# Patient Record
Sex: MALE | Race: WHITE | Employment: FULL TIME | URBAN - METROPOLITAN AREA
[De-identification: names, ages, dates, MRNs, and addresses within clinical notes are randomized per-mention and may not be internally consistent; named-entity substitution may affect disease eponyms.]

---

## 2019-06-10 ENCOUNTER — NURSE ONLY (OUTPATIENT)
Dept: CARDIOLOGY CLINIC | Age: 59
End: 2019-06-10

## 2019-06-10 ENCOUNTER — HOSPITAL ENCOUNTER (OUTPATIENT)
Dept: VASCULAR LAB | Age: 59
Discharge: HOME | End: 2019-06-10

## 2019-06-10 DIAGNOSIS — Z00.00 ANNUAL PHYSICAL EXAM: Primary | ICD-10-CM

## 2019-06-10 PROCEDURE — 76706 US ABDL AORTA SCREEN AAA: CPT | Performed by: INTERNAL MEDICINE

## 2019-06-10 PROCEDURE — 9900000021 HC VASC SCREENING EXAM - SELF PAY

## 2019-06-12 ENCOUNTER — OFFICE VISIT (OUTPATIENT)
Dept: CARDIOLOGY CLINIC | Age: 59
End: 2019-06-12

## 2019-06-12 ENCOUNTER — HOSPITAL ENCOUNTER (OUTPATIENT)
Dept: NON INVASIVE DIAGNOSTICS | Age: 59
Discharge: HOME | End: 2019-06-12
Payer: COMMERCIAL

## 2019-06-12 VITALS
OXYGEN SATURATION: 98 % | DIASTOLIC BLOOD PRESSURE: 82 MMHG | HEIGHT: 74 IN | BODY MASS INDEX: 28.11 KG/M2 | HEART RATE: 83 BPM | WEIGHT: 219 LBS | SYSTOLIC BLOOD PRESSURE: 122 MMHG

## 2019-06-12 DIAGNOSIS — Z00.00 ANNUAL PHYSICAL EXAM: Primary | ICD-10-CM

## 2019-06-12 PROCEDURE — 93017 CV STRESS TEST TRACING ONLY: CPT | Performed by: INTERNAL MEDICINE

## 2019-06-12 PROCEDURE — 99214 OFFICE O/P EST MOD 30 MIN: CPT | Performed by: NURSE PRACTITIONER

## 2019-06-12 PROCEDURE — MISCLABM CINFIN LAB WORK PLUS PSA: Performed by: NURSE PRACTITIONER

## 2019-06-12 PROCEDURE — 93015 CV STRESS TEST SUPVJ I&R: CPT | Performed by: INTERNAL MEDICINE

## 2019-06-12 NOTE — PROGRESS NOTES
Patient instructed on Ildefonso Protocol Stress Test Procedure including possible side effects and adverse reactions. Verbalizes knowledge and understanding and denies having any questions.

## 2019-06-12 NOTE — LETTER
[facility]  [address] [address] [address] [address]  Phone: [phone]  Fax: [phone]    June 16, 2019     Toy Samuel, [address]  [address] [address]    Patient: Dex Mullins  MR Number: [id number]  YOB: 1960  Date of Visit: 6/12/2019    Dear Dr. Toy Samuel:        Giselle Albarran Annual Physical Exam        6/12/19    Dex Mullins 1960 64 y.o. Diagnosis Orders   1. Annual physical exam         Primary Prevention:   Wears seat belt: Yes   Screening colonoscopy: polypectomy '16   Annual opthalmologic exam: Yes   Smoking: No   Diet: loves carbs / pizza    Exercise: walks 1 mile / daily plus walks dog; Bike 45 min, lift on weekends, cut grass weekly   Dermatology exam: recently removed left forehead ; routinely visits d/t sun exposure and s/p facial peals    Flu-vaccination: none in '18   Shingles vaccine: '15   Sunscreen: Yes    Secondary Prevention:    No current outpatient medications on file. No current facility-administered medications for this visit. Review of Systems   Constitutional: Negative for appetite change, + fatigue lessened taking supplements yet interrupted sleep pattern with nocturia. no unexpected weight change. HENT: Negative. Eyes: Negative. Respiratory: Negative. Negative for chest tightness and shortness of breath. Cardiovascular: Negative. Negative for chest pain, palpitations and leg swelling. Gastrointestinal: Negative. Negative for diarrhea and constipation. Genitourinary: Negative for urgency; + frequency d/t enlarged prostate; - hematuria and difficulty urinating. Musculoskeletal: Negative for myalgias, + back pain > phys therapy and core exercises, joint swelling and arthralgias. Skin: Negative. Neurological: + dizziness after bending over doing yard work; - syncope, weakness, light-headedness, numbness and headaches. Hematological: Negative. Component Value Date    LABVLDL 27      No results found for: CHOLHDLRATIO  Lab Results   Component Value Date         K 4.3          CO2 25     BUN 23     CREATININE 0.9     GLUCOSE 108     CALCIUM 9.2       Lab Results   Component Value Date    WBC 7.1     HGB 16.5     HCT 49.0     MCV 87.8           Lab Results   Component Value Date    I7GCIUO 1.01     Y0ANYBR 6.5      Lab Results   Component Value Date    PSA 0.28      Lab Results   Component Value Date    ALT 24     AST 26     ALKPHOS 87     BILITOT 0.5        Vascular screening : 6/10/19   Negative    6/10/19: Urinalysis: trace protein   Stool guaiac: negative    6/12/19: urinalysis : negative  6/12/19: GXT:   No EKG evidence for ischemia with exercise       Rest      ECG   NSR      Stress      Stress Type: Exercise      Stress Protocol: Ildefonso      Rest HR: 86 bpm                             HR BP Product: 33388   Rest BP: 134/92 mmHg                        Max Exercise: 13 METS   Stress Peak HR: 148 bpm   Stress Peak BP: 166/85 mmHg   Predicted HR: 163 bpm   % of predicted HR: 91   Test Duration: 10 min and 31 sec   Reason for Termination: Physiologic Maximum      Results      ECG   Sinus tachycardia, <1mm ST depression      Symptoms   No symptoms with exercise.   Denies chest pain or discomfort.                      1. Annual physical exam   ~unremarkable        Assessment    Elevated blood glucose    GXT: negative stress EKG for ischemia    Plan     Follow up in one year    If you have questions, please do not hesitate to call me. I look forward to following Sean Gens along with you.     Sincerely,        REJI Del Toro - CNP

## 2019-06-12 NOTE — PATIENT INSTRUCTIONS
Consider Crestor as primary prevention since your dad was < 48 yrs old when diagnosed with heart disease

## 2019-06-12 NOTE — PROGRESS NOTES
N7WILNW 1.01     G7LCCRN 6.5      Lab Results   Component Value Date    PSA 0.28      Lab Results   Component Value Date    ALT 24     AST 26     ALKPHOS 87     BILITOT 0.5        Vascular screening : 6/10/19   Negative    6/10/19: Urinalysis: trace protein   Stool guaiac: negative    6/12/19: urinalysis : negative    6/12/19: GXT:   No EKG evidence for ischemia with exercise       Rest      ECG   NSR      Stress      Stress Type: Exercise      Stress Protocol: Ildefonso      Rest HR: 86 bpm                             HR BP Product: 56098   Rest BP: 134/92 mmHg                        Max Exercise: 13 METS   Stress Peak HR: 148 bpm   Stress Peak BP: 166/85 mmHg   Predicted HR: 163 bpm   % of predicted HR: 91   Test Duration: 10 min and 31 sec   Reason for Termination: Physiologic Maximum      Results      ECG   Sinus tachycardia, <1mm ST depression      Symptoms   No symptoms with exercise.   Denies chest pain or discomfort.                      1. Annual physical exam   ~unremarkable        Assessment    Elevated blood glucose    GXT: negative stress EKG for ischemia    Plan     Follow up in one year          Yuliana Cramer CNP

## 2019-06-16 NOTE — COMMUNICATION BODY
Sitting, Cuff Size: Medium Adult)   Pulse 83   Ht 6' 2\" (1.88 m)   Wt 219 lb (99.3 kg)   SpO2 98%   BMI 28.12 kg/m²      Physical Exam   Constitutional: He is oriented to person, place, and time. He appears well-developed and well-nourished. HENT: external ear canals patent bilaterally; TM without injection, normal cone of light; oral mucosa moist/pink, uvula midline  Head: Normocephalic. Eyes: Pupils are equal, round, and reactive to light; gross fundoscopy exam normal   Neck: Neck supple. No JVD present. No tracheal deviation present. No thyromegaly present. Cardiovascular: Normal rate, regular rhythm and intact distal pulses. PMI is not displaced. No murmur heard. Pulmonary/Chest: Effort normal and breath sounds normal. He has no wheezes. He has no rales. He exhibits no tenderness. Abdominal: Soft. Bowel sounds are normal. He exhibits no distension and no mass. No tenderness. Musculoskeletal: Normal range of motion. He exhibits no edema and no tenderness. Lymphadenopathy: He has no cervical/axilla adenopathy. Neurological: He is alert and oriented to person, place, and time. No cranial nerve deficit. Coordination normal.   Skin: Skin is warm and dry. No rash noted. No erythema; scar left forehead . Psychiatric: He has a normal mood and affect.  His behavior is normal. Judgment and thought content normal.       Lab Results   Component Value Date    CHOL 214 06/10/2019     Lab Results   Component Value Date    TRIG 137      Lab Results   Component Value Date    HDL 53      Lab Results   Component Value Date    LDLCALC 134      Lab Results   Component Value Date    LABVLDL 27      No results found for: CHOLHDLRATIO  Lab Results   Component Value Date         K 4.3          CO2 25     BUN 23     CREATININE 0.9     GLUCOSE 108     CALCIUM 9.2       Lab Results   Component Value Date    WBC 7.1     HGB 16.5     HCT 49.0     MCV 87.8           Lab Results   Component Value Date    W1TXVYT 1.01     G5PNSCM 6.5      Lab Results   Component Value Date    PSA 0.28      Lab Results   Component Value Date    ALT 24     AST 26     ALKPHOS 87     BILITOT 0.5        Vascular screening : 6/10/19   Negative    6/10/19: Urinalysis: trace protein   Stool guaiac: negative    6/12/19: urinalysis : negative    6/12/19: GXT:   No EKG evidence for ischemia with exercise       Rest      ECG   NSR      Stress      Stress Type: Exercise      Stress Protocol: Ildefonso      Rest HR: 86 bpm                             HR BP Product: 77060   Rest BP: 134/92 mmHg                        Max Exercise: 13 METS   Stress Peak HR: 148 bpm   Stress Peak BP: 166/85 mmHg   Predicted HR: 163 bpm   % of predicted HR: 91   Test Duration: 10 min and 31 sec   Reason for Termination: Physiologic Maximum      Results      ECG   Sinus tachycardia, <1mm ST depression      Symptoms   No symptoms with exercise.   Denies chest pain or discomfort.                      1. Annual physical exam   ~unremarkable        Assessment    Elevated blood glucose    GXT: negative stress EKG for ischemia    Plan     Follow up in one year          Neeraj Teague CNP

## 2021-03-29 ENCOUNTER — HOSPITAL ENCOUNTER (OUTPATIENT)
Dept: VASCULAR LAB | Age: 61
Discharge: HOME | End: 2021-03-29
Payer: COMMERCIAL

## 2021-03-29 ENCOUNTER — NURSE ONLY (OUTPATIENT)
Dept: CARDIOLOGY CLINIC | Age: 61
End: 2021-03-29

## 2021-03-29 DIAGNOSIS — Z00.00 ANNUAL PHYSICAL EXAM: Primary | ICD-10-CM

## 2021-03-29 LAB
A/G RATIO: 2.5 (ref 1.1–2.2)
ALBUMIN SERPL-MCNC: 4.8 G/DL (ref 3.4–5)
ALP BLD-CCNC: 82 U/L (ref 40–129)
ALT SERPL-CCNC: 25 U/L (ref 10–40)
ANION GAP SERPL CALCULATED.3IONS-SCNC: 10 MMOL/L (ref 3–16)
AST SERPL-CCNC: 28 U/L (ref 15–37)
BASOPHILS ABSOLUTE: 0.1 K/UL (ref 0–0.2)
BASOPHILS RELATIVE PERCENT: 1.5 %
BILIRUB SERPL-MCNC: 0.4 MG/DL (ref 0–1)
BILIRUBIN DIRECT: <0.2 MG/DL (ref 0–0.3)
BILIRUBIN, INDIRECT: NORMAL MG/DL (ref 0–1)
BUN BLDV-MCNC: 15 MG/DL (ref 7–20)
CALCIUM SERPL-MCNC: 9.4 MG/DL (ref 8.3–10.6)
CHLORIDE BLD-SCNC: 103 MMOL/L (ref 99–110)
CHOLESTEROL, TOTAL: 211 MG/DL (ref 0–199)
CO2: 29 MMOL/L (ref 21–32)
CREAT SERPL-MCNC: 0.8 MG/DL (ref 0.9–1.3)
EOSINOPHILS ABSOLUTE: 0.1 K/UL (ref 0–0.6)
EOSINOPHILS RELATIVE PERCENT: 1.6 %
GFR AFRICAN AMERICAN: >60
GFR NON-AFRICAN AMERICAN: >60
GLOBULIN: 1.9 G/DL
GLUCOSE BLD-MCNC: 99 MG/DL (ref 70–99)
HCT VFR BLD CALC: 48.8 % (ref 40.5–52.5)
HDLC SERPL-MCNC: 42 MG/DL (ref 40–60)
HEMOGLOBIN: 16.5 G/DL (ref 13.5–17.5)
HEPATITIS C ANTIBODY INTERPRETATION: NORMAL
LDL CHOLESTEROL CALCULATED: 139 MG/DL
LYMPHOCYTES ABSOLUTE: 2.1 K/UL (ref 1–5.1)
LYMPHOCYTES RELATIVE PERCENT: 36.6 %
MCH RBC QN AUTO: 29.8 PG (ref 26–34)
MCHC RBC AUTO-ENTMCNC: 33.9 G/DL (ref 31–36)
MCV RBC AUTO: 88.1 FL (ref 80–100)
MONOCYTES ABSOLUTE: 0.5 K/UL (ref 0–1.3)
MONOCYTES RELATIVE PERCENT: 7.9 %
NEUTROPHILS ABSOLUTE: 3.1 K/UL (ref 1.7–7.7)
NEUTROPHILS RELATIVE PERCENT: 52.4 %
PDW BLD-RTO: 13.3 % (ref 12.4–15.4)
PLATELET # BLD: 201 K/UL (ref 135–450)
PMV BLD AUTO: 8.6 FL (ref 5–10.5)
POTASSIUM SERPL-SCNC: 4.7 MMOL/L (ref 3.5–5.1)
PROSTATE SPECIFIC ANTIGEN: 0.3 NG/ML (ref 0–4)
RBC # BLD: 5.53 M/UL (ref 4.2–5.9)
SODIUM BLD-SCNC: 142 MMOL/L (ref 136–145)
T3 TOTAL: 0.97 NG/ML (ref 0.8–2)
T4 TOTAL: 6.5 UG/DL (ref 4.5–10.9)
TOTAL PROTEIN: 6.7 G/DL (ref 6.4–8.2)
TRIGL SERPL-MCNC: 151 MG/DL (ref 0–150)
VLDLC SERPL CALC-MCNC: 30 MG/DL
WBC # BLD: 5.8 K/UL (ref 4–11)

## 2021-03-29 PROCEDURE — 9900000021 HC VASC SCREENING EXAM - SELF PAY

## 2021-03-29 PROCEDURE — 36415 COLL VENOUS BLD VENIPUNCTURE: CPT | Performed by: NURSE PRACTITIONER

## 2021-03-29 NOTE — PROGRESS NOTES
Patient came in today for labs for Ivis Fin. Patient tolerated well. Urine dip performed along with stool card.

## 2021-03-30 ENCOUNTER — TELEPHONE (OUTPATIENT)
Dept: CARDIOLOGY CLINIC | Age: 61
End: 2021-03-30

## 2021-04-01 ENCOUNTER — OFFICE VISIT (OUTPATIENT)
Dept: CARDIOLOGY CLINIC | Age: 61
End: 2021-04-01

## 2021-04-01 ENCOUNTER — HOSPITAL ENCOUNTER (OUTPATIENT)
Dept: NON INVASIVE DIAGNOSTICS | Age: 61
Discharge: HOME | End: 2021-04-01
Payer: COMMERCIAL

## 2021-04-01 VITALS
HEIGHT: 74 IN | SYSTOLIC BLOOD PRESSURE: 114 MMHG | BODY MASS INDEX: 27.64 KG/M2 | WEIGHT: 215.4 LBS | HEART RATE: 85 BPM | OXYGEN SATURATION: 98 % | DIASTOLIC BLOOD PRESSURE: 82 MMHG

## 2021-04-01 DIAGNOSIS — R94.39 ABNORMAL STRESS ECG: ICD-10-CM

## 2021-04-01 DIAGNOSIS — Z00.00 ANNUAL PHYSICAL EXAM: Primary | ICD-10-CM

## 2021-04-01 PROCEDURE — 76706 US ABDL AORTA SCREEN AAA: CPT | Performed by: INTERNAL MEDICINE

## 2021-04-01 PROCEDURE — 99214 OFFICE O/P EST MOD 30 MIN: CPT | Performed by: NURSE PRACTITIONER

## 2021-04-01 PROCEDURE — 93017 CV STRESS TEST TRACING ONLY: CPT | Performed by: INTERNAL MEDICINE

## 2021-04-01 PROCEDURE — 93015 CV STRESS TEST SUPVJ I&R: CPT | Performed by: INTERNAL MEDICINE

## 2021-04-01 PROCEDURE — MISCLABM CINFIN LAB WORK PLUS PSA: Performed by: NURSE PRACTITIONER

## 2021-04-01 RX ORDER — ACETAMINOPHEN,DIPHENHYDRAMINE HCL 500; 25 MG/1; MG/1
1 TABLET, FILM COATED ORAL NIGHTLY PRN
COMMUNITY

## 2021-04-01 RX ORDER — LANOLIN ALCOHOL/MO/W.PET/CERES
3 CREAM (GRAM) TOPICAL DAILY
COMMUNITY

## 2021-04-01 NOTE — PROGRESS NOTES
[facility] 5 Annual Physical Exam        4/1/21    Jenna Pritchett 1960 64 y.o. Diagnosis Orders   1. Annual physical exam         Primary Prevention:   Wears seat belt: Yes   Screening colonoscopy: polypectomy '16   Annual opthalmologic exam: Yes   Smoking: No   Diet:  cutting back on carbs (healthy life style modification mtg at work x4 weeks)   Exercise: walks 1 mile / daily plus walks dog; lifts wts 1-2 x / week ; summertime rides bike   Dermatology exam:  routinely visits d/t sun exposure and s/p facial peals    Flu-vaccination: none '20   Shingles vaccine: '15   COVID-19 vaccination : Pfizer    Sunscreen: Yes    Current Outpatient Medications   Medication Sig Dispense Refill    NONFORMULARY Zinc daily      UNABLE TO FIND Iodomere daily      melatonin 3 MG TABS tablet Take 3 mg by mouth daily      UNABLE TO FIND AF Betafood daily      NONFORMULARY Drenamin qd otc      diphenhydrAMINE-APAP, sleep, (TYLENOL PM EXTRA STRENGTH)  MG tablet Take 1 tablet by mouth nightly as needed for Sleep       No current facility-administered medications for this visit. Review of Systems   Constitutional: Negative for appetite change, + fatigue lessened taking supplements yet interrupted sleep pattern with nocturia. no unexpected weight change. HENT: Negative. Eyes: Negative. Respiratory: Negative. Negative for chest tightness and shortness of breath. Cardiovascular: Negative. Negative for chest pain, palpitations and leg swelling. Gastrointestinal: Negative. Negative for diarrhea and constipation. Genitourinary: Negative for urgency; + frequency d/t enlarged prostate > taking supplements which has helped; - hematuria and difficulty urinating. Musculoskeletal: Negative for myalgias, + back pain > core exercises, joint swelling and arthralgias. Skin: Negative.     Neurological: + faint feeling after bending over doing yard work; - syncope, weakness, light-headedness, numbness and headaches. Hematological: Negative. Psychiatric/Behavioral: Negative. The patient is not nervous/anxious. Vitals:    04/01/21 1144 04/01/21 1210   BP: 120/78 114/82   Site: Right Upper Arm    Position: Sitting    Cuff Size: Large Adult    Pulse: 85    SpO2: 98%    Weight: 215 lb 6.4 oz (97.7 kg)    Height: 6' 2\" (1.88 m)      Physical Exam   Constitutional: He is oriented to person, place, and time. He appears well-developed and well-nourished. HENT: external ear canals patent bilaterally with moderately large amt cerumen; TM without injection, normal cone of light; oral exam deferred d/t pandemic safety guidelines in place  Head: Normocephalic. Eyes: Pupils are equal, round, and reactive to light; gross fundoscopy exam normal   Neck: Neck supple. No JVD present. No tracheal deviation present. No thyromegaly present. Cardiovascular: Normal rate, regular rhythm and intact distal pulses. PMI is not displaced. No murmur heard. Pulmonary/Chest: Effort normal and breath sounds normal. He has no wheezes. He has no rales. He exhibits no tenderness. Abdominal: Soft. Bowel sounds are normal. He exhibits no distension and no mass. No tenderness. Musculoskeletal: Normal range of motion. He exhibits no edema and no tenderness. Lymphadenopathy: He has no cervical/axilla adenopathy. Neurological: He is alert and oriented to person, place, and time. No cranial nerve deficit. Coordination normal.   Skin: Skin is warm and dry. No rash noted. No erythema; scar left forehead . Psychiatric: He has a normal mood and affect.  His behavior is normal. Judgment and thought content normal.     Lab Results   Component Value Date    CHOL 211 (H) 03/29/2021    CHOL 216 (H) 04/06/2016    CHOL 200 (H) 05/08/2014     Lab Results   Component Value Date    TRIG 151 (H) 03/29/2021    TRIG 156 (H) 04/06/2016    TRIG 221 (H) 05/08/2014     Lab Results   Component Value Date    HDL 42 03/29/2021    HDL 48 04/06/2016    HDL 52 05/08/2014     Lab Results   Component Value Date    LDLCALC 139 (H) 03/29/2021    LDLCALC 137 (H) 04/06/2016    LDLCALC 104 (H) 05/08/2014     Lab Results   Component Value Date    LABVLDL 30 03/29/2021    LABVLDL 31 04/06/2016    LABVLDL 44 05/08/2014     No results found for: CHOLHDLRATIO   Lab Results   Component Value Date     03/29/2021    K 4.7 03/29/2021     03/29/2021    CO2 29 03/29/2021    BUN 15 03/29/2021    CREATININE 0.8 (L) 03/29/2021    GLUCOSE 99 03/29/2021    CALCIUM 9.4 03/29/2021    PROT 6.7 03/29/2021    LABALBU 4.8 03/29/2021    BILITOT 0.4 03/29/2021    ALKPHOS 82 03/29/2021    AST 28 03/29/2021    ALT 25 03/29/2021    LABGLOM >60 03/29/2021    GFRAA >60 03/29/2021    AGRATIO 2.5 (H) 03/29/2021    GLOB 1.9 03/29/2021     Lab Results   Component Value Date    WBC 5.8 03/29/2021    HGB 16.5 03/29/2021    HCT 48.8 03/29/2021    MCV 88.1 03/29/2021     03/29/2021       Lab Results   Component Value Date    R1FUKCH 0.97 03/29/2021    C0DPTWB 6.5 03/29/2021     Lab Results   Component Value Date    PSA 0.30 03/29/2021    PSA 0.35 04/06/2016    PSA 0.35 05/08/2014        Ref. Range 3/29/2021 08:11   Hep C Ab Interp Latest Ref Range: Non-reactive  Non-reactive       Vascular screening : 3/29/21 :Negative    3/29/21: Urinalysis: negative   Stool guaiac: negative    4/1/21: GXT:   Summary   No evidence of ischemia by EKG. Excellent functional capacity. Abnormal   blood pressure response with relative decrease in blood pressure at peak   stress. Recommend additional evaluation. Discussed with Ines Deal CNP. Rest      ECG   Sinus rhythm. Nonspecific st-t wave changes.       Stress      Stress Type: Exercise      Stress Protocol: Ildefonso      Rest HR: 100 bpm                            HR BP Product: 61824   Rest BP: 128/95 mmHg                        Max Exercise: 13 METS   Stress Peak HR: 155 bpm   Stress Peak BP: 106/83 mmHg   Predicted HR: 162 bpm % of predicted HR: 96   Test Duration: 11 min   Reason for Termination: Physiologic Maximum      Results      ECG   Sinus tachycardia. Normal electrocardiographic response to exercise with less than 1.0 mm of up   sloping ST depression. Excellent exercise capacity. Arrhythmias   None      Symptoms   No symptoms with exercise. Patient denies any chest pain and/or discomfort.          1. Annual physical exam   ~mod-lg amt cerumen build up bilaterally         Assessment    stable   GXT: negative stress EKG for ischemia            abnl BP response at pk exercise    Plan  CTA-cor calcium score after abnl BP response with exercise (understands will be submitted to private insurance payor source / Alyson Lainez)   Consider crestor as primary prevention with suboptimal LDL    ~consider OTC Red Yeast Rice as prefers natural methods     Reminded to call GI for routine colonoscopy post-polypectomy '16   Follow up in one year for annual exam   ~f/u in four weeks to review calcium scoring       REJI Floyd-CNP

## 2021-04-01 NOTE — PROGRESS NOTES
Patient instructed on Plain Ildefonso Protocol Stress Test Procedure including possible side effects. Verbalizes knowledge and understanding and denies having any questions.

## 2021-04-11 ENCOUNTER — HOSPITAL ENCOUNTER (OUTPATIENT)
Dept: CT IMAGING | Age: 61
Discharge: HOME | End: 2021-04-11
Payer: COMMERCIAL

## 2021-04-11 DIAGNOSIS — R94.39 ABNORMAL STRESS ECG: ICD-10-CM

## 2021-04-11 PROCEDURE — 75571 CT HRT W/O DYE W/CA TEST: CPT

## 2023-06-02 ENCOUNTER — HOSPITAL ENCOUNTER (OUTPATIENT)
Age: 63
Setting detail: OUTPATIENT SURGERY
Discharge: HOME | End: 2023-06-02
Attending: INTERNAL MEDICINE | Admitting: INTERNAL MEDICINE
Payer: COMMERCIAL

## 2023-06-02 ENCOUNTER — ANESTHESIA EVENT (OUTPATIENT)
Dept: ENDOSCOPY | Age: 63
End: 2023-06-02
Payer: COMMERCIAL

## 2023-06-02 ENCOUNTER — ANESTHESIA (OUTPATIENT)
Dept: ENDOSCOPY | Age: 63
End: 2023-06-02
Payer: COMMERCIAL

## 2023-06-02 VITALS
HEIGHT: 74 IN | HEART RATE: 78 BPM | BODY MASS INDEX: 29.52 KG/M2 | TEMPERATURE: 97.1 F | RESPIRATION RATE: 16 BRPM | OXYGEN SATURATION: 96 % | DIASTOLIC BLOOD PRESSURE: 79 MMHG | WEIGHT: 230 LBS | SYSTOLIC BLOOD PRESSURE: 116 MMHG

## 2023-06-02 DIAGNOSIS — Z12.11 COLON CANCER SCREENING: ICD-10-CM

## 2023-06-02 PROCEDURE — 7100000011 HC PHASE II RECOVERY - ADDTL 15 MIN: Performed by: INTERNAL MEDICINE

## 2023-06-02 PROCEDURE — 2580000003 HC RX 258: Performed by: ANESTHESIOLOGY

## 2023-06-02 PROCEDURE — 2709999900 HC NON-CHARGEABLE SUPPLY: Performed by: INTERNAL MEDICINE

## 2023-06-02 PROCEDURE — 3700000000 HC ANESTHESIA ATTENDED CARE: Performed by: INTERNAL MEDICINE

## 2023-06-02 PROCEDURE — 2500000003 HC RX 250 WO HCPCS: Performed by: NURSE ANESTHETIST, CERTIFIED REGISTERED

## 2023-06-02 PROCEDURE — 7100000010 HC PHASE II RECOVERY - FIRST 15 MIN: Performed by: INTERNAL MEDICINE

## 2023-06-02 PROCEDURE — 88305 TISSUE EXAM BY PATHOLOGIST: CPT

## 2023-06-02 PROCEDURE — 3609010600 HC COLONOSCOPY POLYPECTOMY SNARE/COLD BIOPSY: Performed by: INTERNAL MEDICINE

## 2023-06-02 PROCEDURE — 2580000003 HC RX 258: Performed by: NURSE ANESTHETIST, CERTIFIED REGISTERED

## 2023-06-02 PROCEDURE — 6360000002 HC RX W HCPCS: Performed by: NURSE ANESTHETIST, CERTIFIED REGISTERED

## 2023-06-02 PROCEDURE — 3700000001 HC ADD 15 MINUTES (ANESTHESIA): Performed by: INTERNAL MEDICINE

## 2023-06-02 RX ORDER — SODIUM CHLORIDE, SODIUM LACTATE, POTASSIUM CHLORIDE, CALCIUM CHLORIDE 600; 310; 30; 20 MG/100ML; MG/100ML; MG/100ML; MG/100ML
INJECTION, SOLUTION INTRAVENOUS CONTINUOUS PRN
Status: DISCONTINUED | OUTPATIENT
Start: 2023-06-02 | End: 2023-06-02 | Stop reason: SDUPTHER

## 2023-06-02 RX ORDER — LIDOCAINE HYDROCHLORIDE 10 MG/ML
1 INJECTION, SOLUTION EPIDURAL; INFILTRATION; INTRACAUDAL; PERINEURAL
Status: DISCONTINUED | OUTPATIENT
Start: 2023-06-02 | End: 2023-06-02 | Stop reason: HOSPADM

## 2023-06-02 RX ORDER — LIDOCAINE HYDROCHLORIDE 20 MG/ML
INJECTION, SOLUTION INFILTRATION; PERINEURAL PRN
Status: DISCONTINUED | OUTPATIENT
Start: 2023-06-02 | End: 2023-06-02 | Stop reason: SDUPTHER

## 2023-06-02 RX ORDER — SODIUM CHLORIDE 0.9 % (FLUSH) 0.9 %
5-40 SYRINGE (ML) INJECTION EVERY 12 HOURS SCHEDULED
Status: DISCONTINUED | OUTPATIENT
Start: 2023-06-02 | End: 2023-06-02 | Stop reason: HOSPADM

## 2023-06-02 RX ORDER — SODIUM CHLORIDE 0.9 % (FLUSH) 0.9 %
5-40 SYRINGE (ML) INJECTION PRN
Status: DISCONTINUED | OUTPATIENT
Start: 2023-06-02 | End: 2023-06-02 | Stop reason: HOSPADM

## 2023-06-02 RX ORDER — PROPOFOL 10 MG/ML
INJECTION, EMULSION INTRAVENOUS PRN
Status: DISCONTINUED | OUTPATIENT
Start: 2023-06-02 | End: 2023-06-02 | Stop reason: SDUPTHER

## 2023-06-02 RX ORDER — ESCITALOPRAM OXALATE 20 MG/1
20 TABLET ORAL DAILY
COMMUNITY
Start: 2023-04-05

## 2023-06-02 RX ORDER — SODIUM CHLORIDE, SODIUM LACTATE, POTASSIUM CHLORIDE, CALCIUM CHLORIDE 600; 310; 30; 20 MG/100ML; MG/100ML; MG/100ML; MG/100ML
INJECTION, SOLUTION INTRAVENOUS CONTINUOUS
Status: DISCONTINUED | OUTPATIENT
Start: 2023-06-02 | End: 2023-06-02 | Stop reason: HOSPADM

## 2023-06-02 RX ORDER — GLYCOPYRROLATE 0.2 MG/ML
INJECTION INTRAMUSCULAR; INTRAVENOUS PRN
Status: DISCONTINUED | OUTPATIENT
Start: 2023-06-02 | End: 2023-06-02 | Stop reason: SDUPTHER

## 2023-06-02 RX ADMIN — PROPOFOL 50 MG: 10 INJECTION, EMULSION INTRAVENOUS at 14:46

## 2023-06-02 RX ADMIN — PROPOFOL 50 MG: 10 INJECTION, EMULSION INTRAVENOUS at 14:51

## 2023-06-02 RX ADMIN — GLYCOPYRROLATE 0.2 MG: 0.2 INJECTION INTRAMUSCULAR; INTRAVENOUS at 14:42

## 2023-06-02 RX ADMIN — PROPOFOL 50 MG: 10 INJECTION, EMULSION INTRAVENOUS at 14:44

## 2023-06-02 RX ADMIN — LIDOCAINE HYDROCHLORIDE 100 MG: 20 INJECTION, SOLUTION INFILTRATION; PERINEURAL at 14:42

## 2023-06-02 RX ADMIN — SODIUM CHLORIDE, POTASSIUM CHLORIDE, SODIUM LACTATE AND CALCIUM CHLORIDE: 600; 310; 30; 20 INJECTION, SOLUTION INTRAVENOUS at 13:13

## 2023-06-02 RX ADMIN — SODIUM CHLORIDE, SODIUM LACTATE, POTASSIUM CHLORIDE, AND CALCIUM CHLORIDE: .6; .31; .03; .02 INJECTION, SOLUTION INTRAVENOUS at 14:33

## 2023-06-02 RX ADMIN — PROPOFOL 50 MG: 10 INJECTION, EMULSION INTRAVENOUS at 14:53

## 2023-06-02 RX ADMIN — PROPOFOL 50 MG: 10 INJECTION, EMULSION INTRAVENOUS at 14:49

## 2023-06-02 RX ADMIN — PROPOFOL 50 MG: 10 INJECTION, EMULSION INTRAVENOUS at 14:57

## 2023-06-02 RX ADMIN — PROPOFOL 100 MG: 10 INJECTION, EMULSION INTRAVENOUS at 14:42

## 2023-06-02 ASSESSMENT — PAIN SCALES - WONG BAKER
WONGBAKER_NUMERICALRESPONSE: 0
WONGBAKER_NUMERICALRESPONSE: 0

## 2023-06-02 ASSESSMENT — PAIN - FUNCTIONAL ASSESSMENT: PAIN_FUNCTIONAL_ASSESSMENT: 0-10

## 2023-06-02 NOTE — PROGRESS NOTES
Ambulatory Surgery/Procedure Discharge Note    Vitals:    06/02/23 1515   BP: 116/79   Pulse: 78   Resp: 16   Temp: 97.1 °F (36.2 °C)   SpO2: 96%       In: 300 [I.V.:300]  Out: -     Restroom use offered before discharge. Yes  Pt is alert oriented and discharge instructions were read at bedside. Tolerates PO well and refuse toileting offered. Copy and paste the GI report. Dr. Tani Hall talk to the spouse via phone already. Pain assessment:  none  Pain Level: 0        Patient discharged to home/self care.  Patient discharged via wheel chair by Cherelle Pereira RN to waiting family/S.O.       6/2/2023 3:42 PM

## 2023-06-02 NOTE — ANESTHESIA POSTPROCEDURE EVALUATION
Department of Anesthesiology  Postprocedure Note    Patient: Anand Benson  MRN: [id number]  YOB: 1960  Date of evaluation: 6/2/2023      Procedure Summary     Date: 06/02/23 Room / Location: [address] [address] / [facility]    Anesthesia Start: 8674 Anesthesia Stop: 1501    Procedure: COLONOSCOPY POLYPECTOMY SNARE/COLD BIOPSY Diagnosis:       Colon cancer screening      (Colon cancer screening [Z12.11])    Surgeons: Kenroy Owusu MD Responsible Provider: Marlee Melgar MD    Anesthesia Type: MAC ASA Status: 2          Anesthesia Type: No value filed.     Madison Phase I: Madison Score: 10    Madison Phase II:        Anesthesia Post Evaluation    Patient location during evaluation: PACU  Level of consciousness: awake  Complications: no  Multimodal analgesia pain management approach

## 2023-06-02 NOTE — H&P
[facility] ENDOSCOPY  Outpatient Procedure H&P    Patient: Nayeli Tong MRN: [id number]     YOB: 1960  Age: 66 y.o. Sex: male    Unit: [facility] ENDOSCOPY Room/Bed: Endo Pool/NONE Location: [address] [facility]     Procedure: Procedure(s):  COLONOSCOPY    Indication: Pre-Op Diagnosis Codes:     * Colon cancer screening [Z12.11]    Referring  Physician:          Nurses past medical history notes reviewed and agreed. Medications reviewed.     Allergies: Pcn [penicillins]     Allergies noted: Yes     Past Medical History:   Past Medical History:   Diagnosis Date    Family history of breast cancer     Measles     PONV (postoperative nausea and vomiting)     Varicose veins 01/01/2003       Past Surgical History:   Past Surgical History:   Procedure Laterality Date    COLONOSCOPY      VARICOSE VEIN SURGERY         Social History:   Social History     Socioeconomic History    Marital status:      Spouse name: Not on file    Number of children: Not on file    Years of education: Not on file    Highest education level: Not on file   Occupational History    Not on file   Tobacco Use    Smoking status: Never    Smokeless tobacco: Never   Vaping Use    Vaping Use: Never used   Substance and Sexual Activity    Alcohol use: Not Currently    Drug use: Never    Sexual activity: Not on file   Other Topics Concern    Not on file   Social History Narrative    Not on file     Social Determinants of Health     Financial Resource Strain: Not on file   Food Insecurity: Not on file   Transportation Needs: Not on file   Physical Activity: Not on file   Stress: Not on file   Social Connections: Not on file   Intimate Partner Violence: Not on file   Housing Stability: Not on file       Family History:   Family History   Problem Relation Age of Onset    Heart Attack Father     Diabetes Father     Stroke Father     Hypertension Father        Home Medications:   Prior to Admission medications    Medication Sig Start Date End

## 2023-06-02 NOTE — ANESTHESIA PRE PROCEDURE
[address]    Drug/Infectious Status (If Applicable):  No results found for: HIV, HEPCAB    COVID-19 Screening (If Applicable): No results found for: COVID19        Anesthesia Evaluation  Patient summary reviewed and Nursing notes reviewed  Airway: Mallampati: III  TM distance: >3 FB   Neck ROM: full  Mouth opening: > = 3 FB   Dental: normal exam         Pulmonary:Negative Pulmonary ROS and normal exam                               Cardiovascular:Negative CV ROS  Exercise tolerance: good (>4 METS),                     Neuro/Psych:   (+) depression/anxiety             GI/Hepatic/Renal: Neg GI/Hepatic/Renal ROS            Endo/Other: Negative Endo/Other ROS                    Abdominal: normal exam            Vascular: negative vascular ROS. Other Findings:           Anesthesia Plan      MAC     ASA 2       Induction: intravenous. Anesthetic plan and risks discussed with patient. Plan discussed with CRNA.     Attending anesthesiologist reviewed and agrees with Renée eMrcado MD   6/2/2023

## 2023-06-02 NOTE — OP NOTE
Colonoscopy Procedure Note    Patient: Leah Charles MRN: [id number]     YOB: 1960  Age: 66 y.o. Sex: male    [facility] Room/Bed: [facility]/NONE Location: [address] [place], [place] [address]       Colonoscopy with polypectomy (cold snare)    Admitting Physician: Beverly Betancur     Primary Care Physician: Opal Goldberg MD      Preoperative Diagnosis: Pre-Op Diagnosis Codes:     * Colon cancer screening [Z12.11]      DATE OF OPERATION: 6/2/2023    OPERATIVE SURGEON: Jolly Odom MD      ANESTHESIA: Monitor Anesthesia Care      Procedure Details:    After informed consent was obtained with all risks and benefits of procedure explained and preoperative exam completed, the patient was taken to the endoscopy suite and placed in the left lateral decubitus position. Upon sequential sedation as per above, a digital rectal exam was performed and was normal.  The Olympus videocolonoscope  was inserted in the rectum and carefully advanced to the cecum. Cecum Intubated Yes. The quality of preparation was good. The colonoscope was slowly withdrawn with careful evaluation between folds. Retroflexion in the rectum was performed. Estimated Blood Loss: minimal    Complications:  none    Findings:   polyp(s) #1, 4 mm in size, located in the cecum removed by cold snare and retrieved for pathology  #2, 6 mm in size, located in the ascending colon removed by cold snare and retrieved for pathology  #3, 4 mm in size, located in the ascending colon removed by cold snare and retrieved for pathology    Plan: Await pathology results for timing of next colonoscopy.         Signed By: Jolly Odom MD

## 2023-06-02 NOTE — DISCHARGE INSTRUCTIONS
ENDOSCOPY DISCHARGE INSTRUCTIONS:    Call the physician that did your procedure for any questions or concern:    [facility]: [phone]  DR. EDWIN HEDRICK          ACTIVITY:    There are potential side effects to the medications used for sedation and anesthesia during your procedure. These include:  Dizziness or light-headedness, confusion or memory loss, delayed reaction times, loss of coordination, nausea and vomiting. Because of your increased risk for injury, we ask that you observe the following precautions: For the next 24 hours,  DO NOT operate an automobile, bicycle, motorcycle, , power tools or large equipment of any kind. Do not drink alcohol, sign any legal documents or make any legal decisions for 24 hours. Do not bend your head over lower than your heart. DO sit on the side of bed/couch awhile before getting up. Plan on bedrest or quiet relaxation today. You may resume normal activities in 24 hours. DIET:    Your first meal today should be light, avoiding spicy and fatty foods. If you tolerate this first meal, then you may advance to your regular diet unless otherwise advised by your physician. NORMAL SYMPTOMS:  -Gaseous discomfort    NOTIFY YOUR PHYSICIAN IF THESE SYMPTOMS OCCUR:  1. Fever (greater than 100)  5. Increased abdominal bloating  2. Severe pain    6. Excessive bleeding  3. Nausea and vomiting  7. Chest pain                                                                    4. Chills    8.  Shortness of breath    ADDITIONAL INSTRUCTIONS:    Biopsy results: Call [address],[facility] for biopsy results in 1 week    Educational Information:    Findings:   polyp(s) #1, 4 mm in size, located in the cecum removed by cold snare and retrieved for pathology  #2, 6 mm in size, located in the ascending colon removed by cold snare and retrieved for pathology  #3, 4 mm in size, located in the ascending colon removed by cold snare and retrieved for pathology     Plan: Await

## (undated) DEVICE — CANNULA SAMP CO2 AD GRN 7FT CO2 AND 7FT O2 TBNG UNIV CONN

## (undated) DEVICE — SNARE COLD OVAL 15MM THIN

## (undated) DEVICE — TRAP POLYP ETRAP